# Patient Record
Sex: FEMALE | Race: WHITE
[De-identification: names, ages, dates, MRNs, and addresses within clinical notes are randomized per-mention and may not be internally consistent; named-entity substitution may affect disease eponyms.]

---

## 2023-10-18 ENCOUNTER — HOSPITAL ENCOUNTER (EMERGENCY)
Dept: HOSPITAL 47 - EC | Age: 38
Discharge: HOME | End: 2023-10-18
Payer: COMMERCIAL

## 2023-10-18 VITALS — HEART RATE: 74 BPM | SYSTOLIC BLOOD PRESSURE: 105 MMHG | DIASTOLIC BLOOD PRESSURE: 70 MMHG | RESPIRATION RATE: 18 BRPM

## 2023-10-18 DIAGNOSIS — Z79.899: ICD-10-CM

## 2023-10-18 DIAGNOSIS — Z88.1: ICD-10-CM

## 2023-10-18 DIAGNOSIS — F41.9: ICD-10-CM

## 2023-10-18 DIAGNOSIS — Z88.8: ICD-10-CM

## 2023-10-18 DIAGNOSIS — R07.89: ICD-10-CM

## 2023-10-18 DIAGNOSIS — F32.A: ICD-10-CM

## 2023-10-18 DIAGNOSIS — K20.90: Primary | ICD-10-CM

## 2023-10-18 LAB
ALBUMIN SERPL-MCNC: 3.2 G/DL (ref 3.5–5)
ALP SERPL-CCNC: 45 U/L (ref 38–126)
ALT SERPL-CCNC: 21 U/L (ref 4–34)
ANION GAP SERPL CALC-SCNC: 7 MMOL/L
APTT BLD: 22 SEC (ref 22–30)
AST SERPL-CCNC: 25 U/L (ref 14–36)
BASOPHILS # BLD AUTO: 0 K/UL (ref 0–0.2)
BASOPHILS NFR BLD AUTO: 1 %
BUN SERPL-SCNC: 15 MG/DL (ref 7–17)
CALCIUM SPEC-MCNC: 8.7 MG/DL (ref 8.4–10.2)
CHLORIDE SERPL-SCNC: 110 MMOL/L (ref 98–107)
CO2 SERPL-SCNC: 23 MMOL/L (ref 22–30)
EOSINOPHIL # BLD AUTO: 0.2 K/UL (ref 0–0.7)
EOSINOPHIL NFR BLD AUTO: 3 %
ERYTHROCYTE [DISTWIDTH] IN BLOOD BY AUTOMATED COUNT: 3.38 M/UL (ref 3.8–5.4)
ERYTHROCYTE [DISTWIDTH] IN BLOOD: 12.2 % (ref 11.5–15.5)
GLUCOSE SERPL-MCNC: 73 MG/DL (ref 74–99)
HCT VFR BLD AUTO: 31.5 % (ref 34–46)
HGB BLD-MCNC: 10.3 GM/DL (ref 11.4–16)
INR PPP: 1 (ref ?–1.2)
LYMPHOCYTES # SPEC AUTO: 1.2 K/UL (ref 1–4.8)
LYMPHOCYTES NFR SPEC AUTO: 22 %
MAGNESIUM SPEC-SCNC: 2 MG/DL (ref 1.6–2.3)
MCH RBC QN AUTO: 30.6 PG (ref 25–35)
MCHC RBC AUTO-ENTMCNC: 32.8 G/DL (ref 31–37)
MCV RBC AUTO: 93.3 FL (ref 80–100)
MONOCYTES # BLD AUTO: 0.4 K/UL (ref 0–1)
MONOCYTES NFR BLD AUTO: 7 %
NEUTROPHILS # BLD AUTO: 3.5 K/UL (ref 1.3–7.7)
NEUTROPHILS NFR BLD AUTO: 65 %
PLATELET # BLD AUTO: 349 K/UL (ref 150–450)
POTASSIUM SERPL-SCNC: 4.4 MMOL/L (ref 3.5–5.1)
PROT SERPL-MCNC: 6 G/DL (ref 6.3–8.2)
PT BLD: 10.8 SEC (ref 10–12.5)
SODIUM SERPL-SCNC: 140 MMOL/L (ref 137–145)
WBC # BLD AUTO: 5.3 K/UL (ref 3.8–10.6)

## 2023-10-18 PROCEDURE — 93970 EXTREMITY STUDY: CPT

## 2023-10-18 PROCEDURE — 84484 ASSAY OF TROPONIN QUANT: CPT

## 2023-10-18 PROCEDURE — 83735 ASSAY OF MAGNESIUM: CPT

## 2023-10-18 PROCEDURE — 85730 THROMBOPLASTIN TIME PARTIAL: CPT

## 2023-10-18 PROCEDURE — 80053 COMPREHEN METABOLIC PANEL: CPT

## 2023-10-18 PROCEDURE — 85379 FIBRIN DEGRADATION QUANT: CPT

## 2023-10-18 PROCEDURE — 93005 ELECTROCARDIOGRAM TRACING: CPT

## 2023-10-18 PROCEDURE — 96361 HYDRATE IV INFUSION ADD-ON: CPT

## 2023-10-18 PROCEDURE — 85025 COMPLETE CBC W/AUTO DIFF WBC: CPT

## 2023-10-18 PROCEDURE — 71046 X-RAY EXAM CHEST 2 VIEWS: CPT

## 2023-10-18 PROCEDURE — 85610 PROTHROMBIN TIME: CPT

## 2023-10-18 PROCEDURE — 71275 CT ANGIOGRAPHY CHEST: CPT

## 2023-10-18 PROCEDURE — 99285 EMERGENCY DEPT VISIT HI MDM: CPT

## 2023-10-18 PROCEDURE — 36415 COLL VENOUS BLD VENIPUNCTURE: CPT

## 2023-10-18 PROCEDURE — 96374 THER/PROPH/DIAG INJ IV PUSH: CPT

## 2023-10-18 NOTE — CT
EXAMINATION TYPE: CT chest angio for PE

CT DLP: 245.9 mGycm, Automated exposure control for dose reduction was used.

 

DATE OF EXAM: 10/18/2023 5:12 PM

 

COMPARISON: None

 

CLINICAL INDICATION:Female, 38 years old with history of eval for PE; chest pain and elevated d-dimer


 

TECHNIQUE/CONTRAST: 

CTA scan of the thorax is performed with IV Contrast, patient injected with 65ml mL of Isovue 370, MI
P images are created and reviewed these are created on a separate workstation..  

 

FINDINGS: 

 

Pulmonary Artery: There is no evidence for a filling defect within the pulmonary vasculature to sugge
st acute pulmonary embolism.  The pulmonary artery is of normal size. 

Lungs/Pleura: No evidence of focal consolidation, pleural effusion or pneumothorax. Few reticular air
space opacities in left lower lobe posteriorly.

Airway: Large airways are patent.

Heart: Heart is within normal limits for size.

Vasculature: No evidence of aortic aneurysm.

Mediastinum: No gross evidence of adenopathy. Small to moderate hiatal hernia. There is thickening of
 the distal esophagus up tor 12 mm.

Musculoskeletal: No acute osseous abnormalities

Soft Tissues: Unremarkable.

Lower neck: No significant findings.

Upper Abdomen: Lesion within the liver measuring 18 mm measuring -71 Hounsfield units.

 

IMPRESSION:

1. No evidence of pulmonary embolism.

2. Left lower lobe posterior airspace opacities correlate for atypical infection which could be minim
al.

3. Distal esophageal wall thickening with postsurgical changes gastric lumen correlate for esophagiti
s. 

4. Consider evaluation for Suspected fatty lesion within the liver. Further evaluation with MRI liver
 mass protocol recommended.

## 2023-10-18 NOTE — US
EXAMINATION TYPE: US venous doppler duplex LE BI

 

DATE OF EXAM: 10/18/2023 4:59 PM

 

COMPARISON: NONE

 

CLINICAL INDICATION: Female, 38 years old with history of eval for dvt; Chest pain today. No hx of DV
T. Not on blood thinners

 

SIDE PERFORMED: Bilateral  

 

TECHNIQUE:  The lower extremity deep venous system is examined utilizing real time linear array sonog
noel with graded compression, doppler sonography and color-flow sonography.

 

VESSELS IMAGED:

Common Femoral Vein

Deep Femoral Vein

Greater Saphenous Vein *

Femoral Vein

Popliteal Vein

Small Saphenous Vein *

Proximal Calf Veins

(* superficial vessels)

 

 

 

Right Leg:  No evidence for DVT

 

Left Leg:  No evidence for DVT

 

 

 

IMPRESSION:  Grayscale, color doppler, spectral doppler imaging performed of the deep veins of the lo
wer extremities.  There is normal flow, compressibility, vascular waveforms.

## 2023-10-18 NOTE — ED
General Adult HPI





- General


Chief complaint: Chest Pain


Stated complaint: Chest Pain


Time Seen by Provider: 10/18/23 15:35


Source: patient, RN notes reviewed, old records reviewed


Mode of arrival: ambulatory


Limitations: no limitations





- History of Present Illness


Initial comments: 





Patient is a 30-year-old female presents emergency Department complaining of 

chest pain.  States it started sometime earlier today or last night.  Describes 

his right upper chest pain somewhat near the sternum with occasional radiation 

towards the right shoulder.  His recent history of attempted overdose on 

Wellbutrin was intubated however has been doing well and has been at [facility] for meth since .  Denies any fevers, cough.  Denies any 

dyspnea.  Denies any emesis but does endorse nausea.  Denies diarrhea or 

abdominal pain.  Has a difficult time describing the pain.  No cardiac history. 

Presents for further evaluation at this time.  I evaluated the patient when she 

was placed in a room.





- Related Data


                                Home Medications











 Medication  Instructions  Recorded  Confirmed


 


Acetaminophen Tab [Tylenol] 650 mg PO Q4H PRN 10/18/23 10/18/23


 


Calcium/Magnesium/Zinc/Vitamin D 1 tab PO TID PRN 10/18/23 10/18/23





334/134/5mg   


 


FLUoxetine HCL [PROzac] 20 mg PO DAILY 10/18/23 10/18/23


 


Ibuprofen [Motrin Ib] 600 mg PO Q6H PRN 10/18/23 10/18/23


 


Mirtazapine 15 mg PO HS 10/18/23 10/18/23


 


Pantoprazole [Protonix] 40 mg PO BID 10/18/23 10/18/23


 


ondansetron HCL [Zofran] 8 mg PO Q6H PRN 10/18/23 10/18/23








                                  Previous Rx's











 Medication  Instructions  Recorded


 


Mag Hydrox/Al Hydrox/Simeth 30 ml PO TID PRN #700 ml 10/18/23





[Maalox]  











                                    Allergies











Allergy/AdvReac Type Severity Reaction Status Date / Time


 


metronidazole [From Flagyl] Allergy  Rash/Hives Verified 10/18/23 18:09


 


topiramate [From Topamax] AdvReac  "Blurred Verified 10/18/23 18:09





   Vision"  














Review of Systems


ROS Statement: 


Those systems with pertinent positive or pertinent negative responses have been 

documented in the HPI.


Review of Systems:


CONST: Denies fever 


EYES: Denies blurry vision 


ENT: Denies nasal congestion  


C/V: Endorses chest pain


RESP: Denies shortness of breath 


GI: Denies abdominal pain 


: Denies dysuria  


SKIN: Denies rash.


MSK: Denies joint pain.


NEURO: Denies headache 


ROS Other: All systems not noted in ROS Statement are negative.





Past Medical History


History of Any Multi-Drug Resistant Organisms: None Reported


Past Surgical History:  Section, Hernia Repair


Additional Past Surgical History / Comment(s): gastric sleeve.


Past Psychological History: Anxiety, Depression


Smoking Status: Never smoker


Past Alcohol Use History: None Reported


Past Drug Use History: Methamphetamine





General Exam





- General Exam Comments


Initial Comments: 





General: Appears in no acute distress.


HEAD:  Normal with no signs of head trauma.


EYES:  PERRLA, EOMI, conjunctiva normal, no discharge.


ENT:  Hearing grossly intact, normal oropharynx.


RESPIRATORY:  Clear breath sounds bilaterally.  No wheezes, rales, or rhonchi.  


C/V:  Regular rate and rhythm. S1 and S2 auscultated, no edema, peripheral 

pulses 2+ and intact throughout


ABD:  Abd is soft, nontender, nondistended


EXT: Normal range of motion, no obvious deformity


SKIN:  No rashes or lesions observed on exposed skin.


NEURO: Alert and oriented 4.


Limitations: no limitations





Course


                                   Vital Signs











  10/18/23 10/18/23 10/18/23





  14:15 14:36 15:30


 


Pulse Rate 80  74


 


Pulse Rate [  90 





Right Supine   





Pulse Oximetery   





]   


 


Respiratory 18  16





Rate   


 


Blood Pressure 101/72  91/59


 


O2 Sat by Pulse 100  97





Oximetry   














  10/18/23 10/18/23 10/18/23





  16:30 17:30 18:30


 


Pulse Rate 69 85 68


 


Pulse Rate [   





Right Supine   





Pulse Oximetery   





]   


 


Respiratory 16 16 16





Rate   


 


Blood Pressure 95/66 119/79 97/66


 


O2 Sat by Pulse 98 100 99





Oximetry   














  10/18/23





  19:37


 


Pulse Rate 74


 


Pulse Rate [ 





Right Supine 





Pulse Oximetery 





] 


 


Respiratory 18





Rate 


 


Blood Pressure 105/70


 


O2 Sat by Pulse 97





Oximetry 














Medical Decision Making





- Medical Decision Making





Was pt. sent in by a medical professional or institution (, PA, NP, urgent 

care, hospital, or nursing home...) When possible be specific


@  -[facility].


Did you speak to anyone other than the patient for history (EMS, parent, family,

police, friend...)? What history was obtained from this source 


@  -No


Did you review nursing and triage notes (agree or disagree)?  Why? 


@  -I reviewed and agree with nursing and triage notes


Were old charts reviewed (outside hosp., previous admission, EMS record, old 

EKG, old radiological studies, urgent care reports/EKG's, nursing home records)?

Report findings 


@  -No old charts were reviewed


Differential Diagnosis (chest pain, altered mental status, abdominal pain women,

abdominal pain men, vaginal bleeding, weakness, fever, dyspnea, syncope, 

headache, dizziness, GI bleed, back pain, seizure, CVA, palpatations, mental 

health, musculoskeletal)? 


@  -Differential Chest Pain:


Stable Angina, Unstable Angina, STEMI, NSTEMI Aortic Dissection, Pneumothorax, 

Musculoskeletal, Esophageal Spasm GERD, Cholecystitis, Pancreatitis, Zoster, 

this is not meant to be an all-inclusive list. 





EKG interpreted by me (3pts min.).


@  -As above


X-rays interpreted by me (1pt min.).


@  -Chest xRay reveals no obvious acute cardio pulmonary process.


CT interpreted by me (1pt min.).


@  -CT angiogram reveals esophagitis but no evidence of pulmonary embolism.


U/S interpreted by me (1pt. min.).


@  -Duplex ultrasounds negative for DVT.


What testing was considered but not performed or refused? (CT, X-rays, U/S, 

labs)? Why?


@  -None


What meds were considered but not given or refused? Why?


@  -None


Did you discuss the management of the patient with other professionals 

(professionals i.e. , PA, NP, lab, RT, psych nurse, , , 

teacher, , )? Give summary


@  -No


Was smoking cessation discussed for >3mins.?


@  -No


Was critical care preformed (if so, how long)?


@  -No


Were there social determinants of health that impacted care today? How? 

(Homelessness, low income, unemployed, alcoholism, drug addiction, 

transportation, low edu. Level, literacy, decrease access to med. care, retirement, 

rehab)?


@  -No


Was there de-escalation of care discussed even if they declined (Discuss DNR or 

withdrawal of care, Hospice)? DNR status


@  -No


What co-morbidities impacted this encounter? (DM, HTN, Smoking, COPD, CAD, 

Cancer, CVA, ARF, Chemo, Hep., AIDS, mental health diagnosis, sleep apnea, 

morbid obesity)?


@  -None


Was patient admitted / discharged? Hospital course, mention meds given and 

route, prescriptions, significant lab abnormalities, going to OR and other 

pertinent info.


@  -Based on the patient's presentation and physical exam, presents with an 

atypical type chest pain.  No cardiac history.  We will obtain cardiopulmonary 

labs.  Patient will be given an aspirin as well as Toradol, GI cocktail, 1 L 

fluid bolus.  Attempted nitroglycerin tablets prior to arrival with no effect on

pain.  Vital signs within acceptable limits.





EKG showed no signs of acute ischemia.  Chest x-ray unremarkable.  Labs 

remarkable for a mild anemia.  Patient has a slightly elevated d-dimer 0.71 

we'll obtain venous duplex ultrasounds as well as CT angiogram.  Troponin 

undetectable.





After the patient.  We will obtain venous duplex ultrasounds as well as CT peace

ogram to evaluate for PE.  Discussed admission versus discharge the patient 

would like to go home with possible.  She is feeling improved in terms of her 

chest pain after the GI cocktail.  I was in agreement with this plan.





Repeat troponin undetectable.  CT negative for PE but does show esophagitis 

which did expand her symptoms.  Based reports ultrasound negative for DVT.





I did discuss the results with the patient.  She'll be discharged home at this 

time.  Strict return precautions discussed.  Symptoms likely secondary to 

esophagitis.  Recommended Maalox as well as other antiacids.  She was in 

agreement this plan.





I will provide the patient with a prescription for Maalox. I instructed the 

patient to follow up with their PCP in the next 1-3 days.  I explained that the 

patient should return to the emergency department if they experience any 

worsening symptoms. Strict return precautions were discussed with the patient. 

The patient expressed understanding of these instructions. I answered all 

questions that the patient had. The patient was discharged home in good conditio

n with their prescriptions and follow up information.








Undiagnosed new problem with uncertain prognosis?


@  -No


Drug Therapy requiring intensive monitoring for toxicity (Heparin, Nitro, 

Insulin, Cardizem)?


@  -No


Were any procedures done?


@  -No


Diagnosis/symptom?


@  -Atypical chest pain, esophagitis


Acute, or Chronic, or Acute on Chronic?


@  -Acute


Uncomplicated (without systemic symptoms) or Complicated (systemic symptoms)?


@  -Uncomplicated


Side effects of treatment?


@  -No


Exacerbation, Progression, or Severe Exacerbation?


@  -No


Poses a threat to life or bodily function? How? (Chest pain, USA, MI, pneumonia,

PE, COPD, DKA, ARF, appy, cholecystitis, CVA, Diverticulitis, Homicidal, 

Suicidal, threat to staff... and all critical care pts)


@  -No





- Lab Data


Result diagrams: 


                                 10/18/23 15:13





                                 10/18/23 15:13


                                   Lab Results











  10/18/23 10/18/23 10/18/23 Range/Units





  15:13 15:13 15:13 


 


WBC  5.3    (3.8-10.6)  k/uL


 


RBC  3.38 L    (3.80-5.40)  m/uL


 


Hgb  10.3 L    (11.4-16.0)  gm/dL


 


Hct  31.5 L    (34.0-46.0)  %


 


MCV  93.3    (80.0-100.0)  fL


 


MCH  30.6    (25.0-35.0)  pg


 


MCHC  32.8    (31.0-37.0)  g/dL


 


RDW  12.2    (11.5-15.5)  %


 


Plt Count  349    (150-450)  k/uL


 


MPV  8.0    


 


Neutrophils %  65    %


 


Lymphocytes %  22    %


 


Monocytes %  7    %


 


Eosinophils %  3    %


 


Basophils %  1    %


 


Neutrophils #  3.5    (1.3-7.7)  k/uL


 


Lymphocytes #  1.2    (1.0-4.8)  k/uL


 


Monocytes #  0.4    (0-1.0)  k/uL


 


Eosinophils #  0.2    (0-0.7)  k/uL


 


Basophils #  0.0    (0-0.2)  k/uL


 


PT   10.8   (10.0-12.5)  sec


 


INR   1.0   (<1.2)  


 


APTT   22.0   (22.0-30.0)  sec


 


D-Dimer   0.71 H   (<0.60)  mg/L FEU


 


Sodium    140  (137-145)  mmol/L


 


Potassium    4.4  (3.5-5.1)  mmol/L


 


Chloride    110 H  ()  mmol/L


 


Carbon Dioxide    23  (22-30)  mmol/L


 


Anion Gap    7  mmol/L


 


BUN    15  (7-17)  mg/dL


 


Creatinine    0.53  (0.52-1.04)  mg/dL


 


Est GFR (CKD-EPI)AfAm    >90  (>60 ml/min/1.73 sqM)  


 


Est GFR (CKD-EPI)NonAf    >90  (>60 ml/min/1.73 sqM)  


 


Glucose    73 L  (74-99)  mg/dL


 


Calcium    8.7  (8.4-10.2)  mg/dL


 


Magnesium    2.0  (1.6-2.3)  mg/dL


 


Total Bilirubin    0.9  (0.2-1.3)  mg/dL


 


AST    25  (14-36)  U/L


 


ALT    21  (4-34)  U/L


 


Alkaline Phosphatase    45  ()  U/L


 


Troponin I     (0.000-0.034)  ng/mL


 


Total Protein    6.0 L  (6.3-8.2)  g/dL


 


Albumin    3.2 L  (3.5-5.0)  g/dL














  10/18/23 10/18/23 Range/Units





  15:13 18:15 


 


WBC    (3.8-10.6)  k/uL


 


RBC    (3.80-5.40)  m/uL


 


Hgb    (11.4-16.0)  gm/dL


 


Hct    (34.0-46.0)  %


 


MCV    (80.0-100.0)  fL


 


MCH    (25.0-35.0)  pg


 


MCHC    (31.0-37.0)  g/dL


 


RDW    (11.5-15.5)  %


 


Plt Count    (150-450)  k/uL


 


MPV    


 


Neutrophils %    %


 


Lymphocytes %    %


 


Monocytes %    %


 


Eosinophils %    %


 


Basophils %    %


 


Neutrophils #    (1.3-7.7)  k/uL


 


Lymphocytes #    (1.0-4.8)  k/uL


 


Monocytes #    (0-1.0)  k/uL


 


Eosinophils #    (0-0.7)  k/uL


 


Basophils #    (0-0.2)  k/uL


 


PT    (10.0-12.5)  sec


 


INR    (<1.2)  


 


APTT    (22.0-30.0)  sec


 


D-Dimer    (<0.60)  mg/L FEU


 


Sodium    (137-145)  mmol/L


 


Potassium    (3.5-5.1)  mmol/L


 


Chloride    ()  mmol/L


 


Carbon Dioxide    (22-30)  mmol/L


 


Anion Gap    mmol/L


 


BUN    (7-17)  mg/dL


 


Creatinine    (0.52-1.04)  mg/dL


 


Est GFR (CKD-EPI)AfAm    (>60 ml/min/1.73 sqM)  


 


Est GFR (CKD-EPI)NonAf    (>60 ml/min/1.73 sqM)  


 


Glucose    (74-99)  mg/dL


 


Calcium    (8.4-10.2)  mg/dL


 


Magnesium    (1.6-2.3)  mg/dL


 


Total Bilirubin    (0.2-1.3)  mg/dL


 


AST    (14-36)  U/L


 


ALT    (4-34)  U/L


 


Alkaline Phosphatase    ()  U/L


 


Troponin I  <0.012  <0.012  (0.000-0.034)  ng/mL


 


Total Protein    (6.3-8.2)  g/dL


 


Albumin    (3.5-5.0)  g/dL














- EKG Data


-: EKG Interpreted by Me


EKG Comments: 





12-lead Electrocardiogram Interpretation Note





EKG was reviewed and interpreted by myself. 12-lead ECG performed at 1410 is 

interpreted by me as revealing normal sinus rhythm at a rate of 84 beats per 

minute.  Axis is normal.  OR interval is 152 ms, QRS duration is 82 ms, QTc is 

414 ms..  There were no ST or T wave abnormalities to suggest myocardial 

ischemia or injury. R wave progression across the precordium was satisfactory. 

By my interpretation this EKG is non-diagnostic for acute ischemia.





Disposition


Clinical Impression: 


 Atypical chest pain, Esophagitis





Disposition: HOME SELF-CARE


Condition: Good


Instructions (If sedation given, give patient instructions):  Esophagitis (ED)


Prescriptions: 


Mag Hydrox/Al Hydrox/Simeth [Maalox] 30 ml PO TID PRN #700 ml


 PRN Reason: Dyspepsia


Is patient prescribed a controlled substance at d/c from ED?: No


Referrals: 


None,Stated [Primary Care Provider] - 1-2 days


Ambreen Mueller MD [STAFF PHYSICIAN] - 1-2 days


Time of Disposition: 19:22

## 2023-10-18 NOTE — XR
EXAMINATION TYPE: XR chest 2V

 

DATE OF EXAM: 10/18/2023

 

COMPARISON: None

 

HISTORY: 38-year-old female with chest pain

 

TECHNIQUE:  PA and lateral views

 

FINDINGS:  

Heart normal size. Aorta and pulmonary vasculature within normal limits. Interstitial prominence and 
peribronchial cuffing. No noam consolidation or pleural effusion.

 

 

IMPRESSION:  

Possible COPD. Clinically correlate. Interstitial density and peribronchial cuffing suggests bronchit
is, asthma, or atypical pneumonias. No focal infiltrate seen.